# Patient Record
Sex: MALE | Race: WHITE | URBAN - METROPOLITAN AREA
[De-identification: names, ages, dates, MRNs, and addresses within clinical notes are randomized per-mention and may not be internally consistent; named-entity substitution may affect disease eponyms.]

---

## 2024-03-04 ENCOUNTER — OFFICE VISIT (OUTPATIENT)
Age: 24
End: 2024-03-04

## 2024-03-04 VITALS
BODY MASS INDEX: 21.19 KG/M2 | WEIGHT: 148 LBS | OXYGEN SATURATION: 99 % | SYSTOLIC BLOOD PRESSURE: 130 MMHG | HEART RATE: 79 BPM | TEMPERATURE: 97.4 F | DIASTOLIC BLOOD PRESSURE: 78 MMHG | HEIGHT: 70 IN

## 2024-03-04 DIAGNOSIS — J01.10 ACUTE NON-RECURRENT FRONTAL SINUSITIS: Primary | ICD-10-CM

## 2024-03-04 LAB — STREPTOCOCCUS A RNA: NEGATIVE

## 2024-03-04 RX ORDER — PREDNISONE 20 MG/1
20 TABLET ORAL DAILY
Qty: 5 TABLET | Refills: 0 | Status: SHIPPED | OUTPATIENT
Start: 2024-03-04 | End: 2024-03-09

## 2024-03-04 NOTE — PROGRESS NOTES
Nitesh Al (:  2000) is a 23 y.o. male,New patient, here for evaluation of the following chief complaint(s):  Pharyngitis (Sore throat, SOB, sinus congestion x yesterday)      ASSESSMENT/PLAN:    ICD-10-CM    1. Acute non-recurrent frontal sinusitis  J01.10 predniSONE (DELTASONE) 20 MG tablet     POCT Rapid Strep A DNA ([facility] i)        Results for POC orders placed in visit on 24   POCT Rapid Strep A DNA (Alere i)   Result Value Ref Range    Streptococcus A RNA negative         Dx Diff:strep, Influenza, sinusitis   Education and handout provided on diagnosis and management of symptoms.   AVS reviewed with patient. Follow up as needed in UC or with PCP for new or worsening symptoms.   Return if symptoms worsen or fail to improve.    SUBJECTIVE/OBJECTIVE:  Patient presents today with complaints of sore throat that started yesterday      History provided by:  Patient   used: No    Pharyngitis      Vitals:    24 1814   BP: 130/78   Site: Right Upper Arm   Position: Sitting   Cuff Size: Medium Adult   Pulse: 79   Temp: 97.4 °F (36.3 °C)   TempSrc: Oral   SpO2: 99%   Weight: 67.1 kg (148 lb)   Height: 1.778 m (5' 10\")       Review of Systems    Physical Exam  Constitutional:       Appearance: Normal appearance.   HENT:      Head: Normocephalic.      Nose: Nose normal.      Mouth/Throat:      Mouth: Mucous membranes are moist.      Pharynx: Oropharynx is clear. Posterior oropharyngeal erythema present.   Cardiovascular:      Rate and Rhythm: Normal rate and regular rhythm.      Heart sounds: Normal heart sounds.   Pulmonary:      Effort: Pulmonary effort is normal.      Breath sounds: Normal breath sounds.   Musculoskeletal:      Cervical back: Normal range of motion and neck supple.   Skin:     General: Skin is warm and dry.   Neurological:      Mental Status: He is alert and oriented to person, place, and time.   Psychiatric:         Mood and Affect: Mood normal.

## 2024-03-04 NOTE — PATIENT INSTRUCTIONS
Thank you for allowing us to care for you today and we hope you feel better soon  Tylenol/Motrin as needed for fever and discomfort  OTC allergy medicine is helpful but will not cure symptoms  New Prescriptions    PREDNISONE (DELTASONE) 20 MG TABLET    Take 1 tablet by mouth daily for 5 days